# Patient Record
Sex: MALE | Race: WHITE | Employment: OTHER | URBAN - METROPOLITAN AREA
[De-identification: names, ages, dates, MRNs, and addresses within clinical notes are randomized per-mention and may not be internally consistent; named-entity substitution may affect disease eponyms.]

---

## 2024-08-09 ENCOUNTER — INITIAL CONSULT (OUTPATIENT)
Age: 79
End: 2024-08-09
Payer: MEDICARE

## 2024-08-09 VITALS
WEIGHT: 133.2 LBS | DIASTOLIC BLOOD PRESSURE: 70 MMHG | HEART RATE: 83 BPM | BODY MASS INDEX: 18.65 KG/M2 | SYSTOLIC BLOOD PRESSURE: 124 MMHG | HEIGHT: 71 IN

## 2024-08-09 DIAGNOSIS — R55 NEAR SYNCOPE: ICD-10-CM

## 2024-08-09 DIAGNOSIS — R94.31 ABNORMAL ELECTROCARDIOGRAPHY: ICD-10-CM

## 2024-08-09 DIAGNOSIS — I10 ESSENTIAL HYPERTENSION, BENIGN: Primary | ICD-10-CM

## 2024-08-09 PROCEDURE — 1123F ACP DISCUSS/DSCN MKR DOCD: CPT | Performed by: INTERNAL MEDICINE

## 2024-08-09 PROCEDURE — 1036F TOBACCO NON-USER: CPT | Performed by: INTERNAL MEDICINE

## 2024-08-09 PROCEDURE — G8420 CALC BMI NORM PARAMETERS: HCPCS | Performed by: INTERNAL MEDICINE

## 2024-08-09 PROCEDURE — 93000 ELECTROCARDIOGRAM COMPLETE: CPT | Performed by: INTERNAL MEDICINE

## 2024-08-09 PROCEDURE — G8427 DOCREV CUR MEDS BY ELIG CLIN: HCPCS | Performed by: INTERNAL MEDICINE

## 2024-08-09 PROCEDURE — 3078F DIAST BP <80 MM HG: CPT | Performed by: INTERNAL MEDICINE

## 2024-08-09 PROCEDURE — 99204 OFFICE O/P NEW MOD 45 MIN: CPT | Performed by: INTERNAL MEDICINE

## 2024-08-09 PROCEDURE — 3074F SYST BP LT 130 MM HG: CPT | Performed by: INTERNAL MEDICINE

## 2024-08-09 ASSESSMENT — ENCOUNTER SYMPTOMS: SHORTNESS OF BREATH: 0

## 2024-08-09 NOTE — PROGRESS NOTES
[address] [facility], [place] [address]  PHONE: [phone]    SUBJECTIVE:   Ricky Ho is a 79 y.o. male 1945   seen for a consultation visit regarding the following:     Chief Complaint   Patient presents with    Consultation     Abnormal EKG    Dizziness              Consultation is requested for evaluation of Consultation (Abnormal EKG) and Dizziness   .    History of present illness: 79 y.o. male with PMH HTN, HLD, GERD presenting for evaluation of abnormal ECG and dizziness. One week ago patient was at the grocery store and he suddenly felt like he was going to faint. He has continued to feel light-headedness and has been sweating more than usual. He went to his PCP for these symptoms and was noted with an abnormal ECG. He was advised to go to the ED for evaluation and a referral was placed to cardiology. He decided not to go to the ED. He denies chest pain, dyspnea, syncope or palpitations. No FH premature CAD.    Reviewed referral note      Past Medical History, Past Surgical History, Family history, Social History, and Medications were all reviewed with the patient today and updated as necessary.       No Known Allergies  Past Medical History:   Diagnosis Date    GERD (gastroesophageal reflux disease)     H/O seasonal allergies     Hypercholesterolemia     Hyperlipidemia     Hypertension      Past Surgical History:   Procedure Laterality Date    TONSILLECTOMY       Family History   Problem Relation Age of Onset    Heart Disease Brother     Cancer Father         leukemia    Heart Disease Father     Cancer Mother         brain tumor     Social History     Tobacco Use    Smoking status: Former    Smokeless tobacco: Never   Substance Use Topics    Alcohol use: Yes       ROS:    Review of Systems   Cardiovascular:  Positive for near-syncope. Negative for chest pain, dyspnea on exertion, palpitations and syncope.   Respiratory:  Negative for shortness of breath.    Neurological:

## 2024-08-21 ENCOUNTER — TELEPHONE (OUTPATIENT)
Age: 79
End: 2024-08-21

## 2024-08-21 NOTE — TELEPHONE ENCOUNTER
----- Message from Dr. 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 I would recommend a repeat echo in about 5 years to keep an eye on this, but otherwise no additional testing or treatment is needed at this time. I do not see anything that would explain his near passing out. We will await his stress test results to ensure everything else looks good with his heart. Thank you.